# Patient Record
(demographics unavailable — no encounter records)

---

## 2024-12-30 NOTE — HISTORY OF PRESENT ILLNESS
[FreeTextEntry1] : Patient with epigastric pain for about 1 year.  It occurs with each meal.  She gets cramping epigastric pain.  Worse with pizza tomato sauce red meat fried foods.  She gets nausea "all the time".  She does have episodes of heartburn with acidic foods.  She currently takes no medication for this.  In the past she took pantoprazole but ran out of medications.  She had good results with pantoprazole.  She currently has no weight loss.  She had an EGD about 2 years ago with the [facility] in [place]..  She had a routine screening colonoscopy at that time as well.  I do not have those results.     Patient states she was treated for H. pylori but could not tolerate the antibiotics and had to stop treatment.  She did not complete it

## 2024-12-30 NOTE — ASSESSMENT
[FreeTextEntry1] : A/P Patient with epigastric pain-likely GERD Today's instructions for acid reflux include avoid provocative foods. For example citrus alcohol coffee chocolate mints. Smaller meals, no eating 3 hours prior to bedtime and elevate head of the bed prior to sleep. Pantoprazole 40 mg a day ordered EGD to be performed  I discussed the risks and benefits of EGD and patient was given opportunity to ask questions. EGD to r/o Spangler's  esophagus, PUD, mass, AVM'S. Pt is medically optimized for EGD    Will get EGD colonoscopy pathology and last progress notes from the GI group in [place]-.  If patient has H. pylori would be nice to find out what treatment she was given in the past..  If treated for H. pylori she may need Zofran in addition to the H. pylori medication